# Patient Record
Sex: MALE | Race: WHITE | NOT HISPANIC OR LATINO | URBAN - METROPOLITAN AREA
[De-identification: names, ages, dates, MRNs, and addresses within clinical notes are randomized per-mention and may not be internally consistent; named-entity substitution may affect disease eponyms.]

---

## 2024-12-16 ENCOUNTER — OFFICE VISIT (OUTPATIENT)
Dept: URGENT CARE | Age: 24
End: 2024-12-16
Payer: COMMERCIAL

## 2024-12-16 VITALS
OXYGEN SATURATION: 96 % | SYSTOLIC BLOOD PRESSURE: 123 MMHG | BODY MASS INDEX: 24.65 KG/M2 | DIASTOLIC BLOOD PRESSURE: 85 MMHG | HEART RATE: 80 BPM | HEIGHT: 72 IN | TEMPERATURE: 99.4 F | WEIGHT: 182 LBS

## 2024-12-16 DIAGNOSIS — R50.9 FEVER, UNSPECIFIED FEVER CAUSE: ICD-10-CM

## 2024-12-16 DIAGNOSIS — J10.1 INFLUENZA A: Primary | ICD-10-CM

## 2024-12-16 DIAGNOSIS — Z20.822 SUSPECTED COVID-19 VIRUS INFECTION: ICD-10-CM

## 2024-12-16 LAB
POC RAPID INFLUENZA A: POSITIVE
POC RAPID INFLUENZA B: NEGATIVE
POC SARS-COV-2 AG BINAX: NORMAL

## 2024-12-16 PROCEDURE — 3008F BODY MASS INDEX DOCD: CPT | Performed by: PHYSICIAN ASSISTANT

## 2024-12-16 PROCEDURE — 87804 INFLUENZA ASSAY W/OPTIC: CPT | Performed by: PHYSICIAN ASSISTANT

## 2024-12-16 PROCEDURE — 87811 SARS-COV-2 COVID19 W/OPTIC: CPT | Performed by: PHYSICIAN ASSISTANT

## 2024-12-16 PROCEDURE — 99204 OFFICE O/P NEW MOD 45 MIN: CPT | Performed by: PHYSICIAN ASSISTANT

## 2024-12-16 RX ORDER — OSELTAMIVIR PHOSPHATE 75 MG/1
75 CAPSULE ORAL EVERY 12 HOURS
Qty: 10 CAPSULE | Refills: 0 | Status: SHIPPED | OUTPATIENT
Start: 2024-12-16 | End: 2024-12-21

## 2024-12-16 NOTE — PROGRESS NOTES
Subjective   Patient ID: Rudy Abbott is a 24 y.o. male. They present today with a chief complaint of Cough (C/o fever, cough, congestion x 2 days).    History of Present Illness  HPI  Presents with 2 days of cough, congestion, sore throat, bodyaches. Low grade temps. No CP, SOB. Had flu shot this year. Otherwise healthy.     Past Medical History  Allergies as of 12/16/2024    (No Known Allergies)       (Not in a hospital admission)             Past Medical History:   Diagnosis Date    Other fracture of upper and lower end of unspecified fibula, initial encounter for closed fracture 07/09/2014    Fracture of fibula, distal    Personal history of other diseases of the digestive system     History of gastroesophageal reflux (GERD)    Personal history of other diseases of the respiratory system     Personal history of asthma    Unspecified fracture of shaft of left fibula, initial encounter for closed fracture     Fracture of left fibula       Past Surgical History:   Procedure Laterality Date    HERNIA REPAIR  11/19/2013    Hernia Repair    TYMPANOSTOMY TUBE PLACEMENT  11/19/2013    Ear Pressure Equalization Tube, Insertion, Bilaterally            Review of Systems  Review of Systems   Review of systems: A complete review of systems was done, and is as stated in the history of present illness, is otherwise negative or not pertinent to the complaint.       Objective    Vitals:    12/16/24 1245   BP: 123/85   Pulse: 80   Temp: 37.4 °C (99.4 °F)   TempSrc: Oral   SpO2: 96%   Weight: 82.6 kg (182 lb)   Height: 1.829 m (6')     No LMP for male patient.    Physical Exam  NAD. Well appearing    MMM   PERRLA   no icterus   TMs clear bilat   Oropharynx injected and erythema. No exudates  neck supple. ENID. No LAD   no resp distress. Lungs CTAB without w/r/r   RRR. No m/r/g   Abd; Soft. NTTP   BRANHAM x 4. MS 5/5   Skin; warm without rashes   pulses 2+ throughout   neuro intact with normal sensation and motor   psych a&o x 3        Procedures    Point of Care Test & Imaging Results from this visit    Assessment/Plan   Allergies, medications, history, and pertinent labs/EKGs/Imaging reviewed by Wilfredo Alcazar PA-C.     Medical Decision Making  - + Flu A  -no signs of secondary bacterial infection  -Tamiflu course  -supportive care  -red flags to the ED  All ?s answered    Orders and Diagnoses  Diagnoses and all orders for this visit:  Influenza A  -     oseltamivir (Tamiflu) 75 mg capsule; Take 1 capsule (75 mg) by mouth every 12 hours for 5 days.  Fever, unspecified fever cause  -     POCT Influenza A/B manually resulted  -     POCT Covid-19 Rapid Antigen  Suspected COVID-19 virus infection